# Patient Record
Sex: FEMALE | Race: WHITE | NOT HISPANIC OR LATINO | URBAN - METROPOLITAN AREA
[De-identification: names, ages, dates, MRNs, and addresses within clinical notes are randomized per-mention and may not be internally consistent; named-entity substitution may affect disease eponyms.]

---

## 2018-09-10 ENCOUNTER — APPOINTMENT (OUTPATIENT)
Dept: URBAN - METROPOLITAN AREA CLINIC 237 | Age: 38
Setting detail: DERMATOLOGY
End: 2018-09-18

## 2018-09-10 DIAGNOSIS — L71.8 OTHER ROSACEA: ICD-10-CM

## 2018-09-10 DIAGNOSIS — D22 MELANOCYTIC NEVI: ICD-10-CM

## 2018-09-10 DIAGNOSIS — L90.5 SCAR CONDITIONS AND FIBROSIS OF SKIN: ICD-10-CM

## 2018-09-10 DIAGNOSIS — L82.0 INFLAMED SEBORRHEIC KERATOSIS: ICD-10-CM

## 2018-09-10 DIAGNOSIS — L70.0 ACNE VULGARIS: ICD-10-CM

## 2018-09-10 DIAGNOSIS — L82.1 OTHER SEBORRHEIC KERATOSIS: ICD-10-CM

## 2018-09-10 PROBLEM — D22.4 MELANOCYTIC NEVI OF SCALP AND NECK: Status: ACTIVE | Noted: 2018-09-10

## 2018-09-10 PROBLEM — D22.71 MELANOCYTIC NEVI OF RIGHT LOWER LIMB, INCLUDING HIP: Status: ACTIVE | Noted: 2018-09-10

## 2018-09-10 PROBLEM — D22.5 MELANOCYTIC NEVI OF TRUNK: Status: ACTIVE | Noted: 2018-09-10

## 2018-09-10 PROBLEM — D22.62 MELANOCYTIC NEVI OF LEFT UPPER LIMB, INCLUDING SHOULDER: Status: ACTIVE | Noted: 2018-09-10

## 2018-09-10 PROBLEM — D22.72 MELANOCYTIC NEVI OF LEFT LOWER LIMB, INCLUDING HIP: Status: ACTIVE | Noted: 2018-09-10

## 2018-09-10 PROBLEM — D22.61 MELANOCYTIC NEVI OF RIGHT UPPER LIMB, INCLUDING SHOULDER: Status: ACTIVE | Noted: 2018-09-10

## 2018-09-10 PROCEDURE — OTHER OTHER: OTHER

## 2018-09-10 PROCEDURE — OTHER NOTED ON EXAM BUT NOT TREATED: OTHER

## 2018-09-10 PROCEDURE — OTHER PATIENT SPECIFIC COUNSELING: OTHER

## 2018-09-10 PROCEDURE — OTHER REASSURANCE: OTHER

## 2018-09-10 PROCEDURE — OTHER DEFER: OTHER

## 2018-09-10 PROCEDURE — 99204 OFFICE O/P NEW MOD 45 MIN: CPT

## 2018-09-10 PROCEDURE — OTHER OBSERVATION: OTHER

## 2018-09-10 PROCEDURE — OTHER OBSERVATION AND MEASURE: OTHER

## 2018-09-10 PROCEDURE — OTHER TREATMENT REGIMEN: OTHER

## 2018-09-10 PROCEDURE — OTHER DIAGNOSIS COMMENT: OTHER

## 2018-09-10 PROCEDURE — OTHER COUNSELING: OTHER

## 2018-09-10 PROCEDURE — OTHER EDUCATIONAL RESOURCES PROVIDED: OTHER

## 2018-09-10 PROCEDURE — OTHER PHOTO-DOCUMENTATION: OTHER

## 2018-09-10 ASSESSMENT — LOCATION SIMPLE DESCRIPTION DERM
LOCATION SIMPLE: LEFT POSTERIOR THIGH
LOCATION SIMPLE: LEFT UPPER BACK
LOCATION SIMPLE: LEFT THIGH
LOCATION SIMPLE: NOSE
LOCATION SIMPLE: RIGHT ANTERIOR NECK
LOCATION SIMPLE: RIGHT UPPER BACK
LOCATION SIMPLE: LEFT LOWER BACK
LOCATION SIMPLE: LEFT 3RD TOE
LOCATION SIMPLE: UPPER BACK
LOCATION SIMPLE: RIGHT PRETIBIAL REGION
LOCATION SIMPLE: LEFT POSTERIOR UPPER ARM
LOCATION SIMPLE: LEFT SCALP
LOCATION SIMPLE: GLABELLA
LOCATION SIMPLE: RIGHT CALF
LOCATION SIMPLE: RIGHT POSTERIOR UPPER ARM
LOCATION SIMPLE: LEFT CALF
LOCATION SIMPLE: RIGHT THIGH
LOCATION SIMPLE: RIGHT FOREHEAD
LOCATION SIMPLE: RIGHT CHEEK
LOCATION SIMPLE: ABDOMEN
LOCATION SIMPLE: LEFT BUTTOCK
LOCATION SIMPLE: LEFT CHEEK

## 2018-09-10 ASSESSMENT — LOCATION DETAILED DESCRIPTION DERM
LOCATION DETAILED: LEFT ANTERIOR PROXIMAL THIGH
LOCATION DETAILED: LEFT DISTAL POSTERIOR THIGH
LOCATION DETAILED: LEFT BUTTOCK
LOCATION DETAILED: LEFT SUPERIOR MEDIAL LOWER BACK
LOCATION DETAILED: LEFT INFERIOR CENTRAL MALAR CHEEK
LOCATION DETAILED: RIGHT DISTAL POSTERIOR UPPER ARM
LOCATION DETAILED: RIGHT ANTERIOR PROXIMAL THIGH
LOCATION DETAILED: RIGHT LATERAL UPPER BACK
LOCATION DETAILED: LEFT MID-UPPER BACK
LOCATION DETAILED: LEFT MEDIAL FRONTAL SCALP
LOCATION DETAILED: LEFT PROXIMAL POSTERIOR UPPER ARM
LOCATION DETAILED: LEFT LATERAL 3RD TOE
LOCATION DETAILED: RIGHT DISTAL CALF
LOCATION DETAILED: GLABELLA
LOCATION DETAILED: LEFT DISTAL CALF
LOCATION DETAILED: NASAL TIP
LOCATION DETAILED: PERIUMBILICAL SKIN
LOCATION DETAILED: RIGHT SUPERIOR MEDIAL FOREHEAD
LOCATION DETAILED: RIGHT INFERIOR ANTERIOR NECK
LOCATION DETAILED: RIGHT DISTAL PRETIBIAL REGION
LOCATION DETAILED: INFERIOR THORACIC SPINE
LOCATION DETAILED: LEFT LATERAL ABDOMEN
LOCATION DETAILED: RIGHT SUPERIOR MEDIAL UPPER BACK
LOCATION DETAILED: LEFT SUPERIOR UPPER BACK
LOCATION DETAILED: RIGHT INFERIOR CENTRAL MALAR CHEEK
LOCATION DETAILED: EPIGASTRIC SKIN

## 2018-09-10 ASSESSMENT — LOCATION ZONE DERM
LOCATION ZONE: NECK
LOCATION ZONE: TRUNK
LOCATION ZONE: LEG
LOCATION ZONE: NOSE
LOCATION ZONE: ARM
LOCATION ZONE: FACE
LOCATION ZONE: TOE
LOCATION ZONE: SCALP

## 2018-09-10 NOTE — PROCEDURE: OBSERVATION
Detail Level: Simple
Size Of Lesion In Cm (Optional): 0
Morphology Per Location (Optional): Brown, NR
Morphology Per Location (Optional): Dark brown, slightly raised
Size Of Lesion: 7x5mm
Size Of Lesion: 4x3mm
Morphology Per Location (Optional): 3 very dark brown macules
Size Of Lesion: 6x3mm
Morphology Per Location (Optional): Very light brown with dark brown dot
Detail Level: Zone
Morphology Per Location (Optional): Dark brown, jagged
Morphology Per Location (Optional): Brown maculopapule
Morphology Per Location (Optional): Jagged brown
Morphology Per Location (Optional): 3x2mm
Size Of Lesion: 5x6mm
Morphology Per Location (Optional): Pink/brown maculopapule
Morphology Per Location (Optional): Pinkish brown, slightly raised
Morphology Per Location (Optional): Very dark brown, homogenous
Morphology Per Location (Optional): Few, hazy

## 2018-09-10 NOTE — HPI: FULL BODY SKIN EXAMINATION
Additional History: Lesions of concern:\\n- multiple brown, warty growths on upper abdomen; occurred during pregnancy\\n- Pt feels that all her moles became darker after/during pregnancy\\n- right anterior scalp: brown, raised; present around 5yrs\\n- two wart-like lesions on L+R flank\\n\\nLast FBE: 3+yrs ago by Dr. Kee in [place] Additional History: Lesions of concern:\\n- multiple brown, warty growths on upper abdomen; occurred during pregnancy\\n- Pt feels that all her moles became darker after/during pregnancy\\n- right anterior scalp: brown, raised; present around 5yrs\\n- two wart-like lesions on L+R flank\\n\\nLast FBE: 3+yrs ago by Dr. Kee in [place]

## 2018-09-10 NOTE — PROCEDURE: OTHER
Detail Level: Zone
Other (Free Text): AHU + BA applied. Advised that Pt avoid using Neosporin.  Recommends plain healing ointments (ie. Vaseline, AHU).
Note Text (......Xxx Chief Complaint.): This diagnosis correlates with the

## 2018-09-10 NOTE — PROCEDURE: PHOTO-DOCUMENTATION
Photo Preface (Leave Blank If You Do Not Want): Photographs obtained today
Detail Level: Zone
Details (Free Text): 4

## 2018-09-10 NOTE — PROCEDURE: PATIENT SPECIFIC COUNSELING
Discussed that lesion can be tx’d with LN2 if causing irritation for Pt.
Pt is not interested in tx’ing AR at this time.
Detail Level: Simple
Recommend HC 1% cr bid PRN to areas with irritated lesions. Pt to stop applying tea tree oil

## 2018-09-25 ENCOUNTER — APPOINTMENT (OUTPATIENT)
Dept: URBAN - METROPOLITAN AREA CLINIC 237 | Age: 38
Setting detail: DERMATOLOGY
End: 2018-09-25

## 2018-09-25 DIAGNOSIS — L82.0 INFLAMED SEBORRHEIC KERATOSIS: ICD-10-CM

## 2018-09-25 PROCEDURE — OTHER LIQUID NITROGEN: OTHER

## 2018-09-25 PROCEDURE — OTHER TREATMENT REGIMEN: OTHER

## 2018-09-25 PROCEDURE — 17111 DESTRUCT LESION 15 OR MORE: CPT

## 2018-09-25 PROCEDURE — OTHER PATIENT SPECIFIC COUNSELING: OTHER

## 2018-09-25 ASSESSMENT — LOCATION DETAILED DESCRIPTION DERM
LOCATION DETAILED: LEFT RIB CAGE
LOCATION DETAILED: SUBXIPHOID
LOCATION DETAILED: RIGHT RIB CAGE

## 2018-09-25 ASSESSMENT — LOCATION ZONE DERM: LOCATION ZONE: TRUNK

## 2018-09-25 ASSESSMENT — LOCATION SIMPLE DESCRIPTION DERM: LOCATION SIMPLE: ABDOMEN

## 2018-09-25 NOTE — PROCEDURE: PATIENT SPECIFIC COUNSELING
Detail Level: Simple
Recommends a non-steroidal, soothing topical for anti-itch. (S) Gold Bond eczema relief with oatmeal given for pt to try.  If lesions do not respond to LN2, RTC

## 2018-09-25 NOTE — PROCEDURE: LIQUID NITROGEN
Render Post Care In The Note?: yes
Detail Level: Zone
Number Of Freeze-Thaw Cycles: 3 freeze-thaw cycles
Medical Necessity Clause: This procedure was medically necessary because the lesions that were treated were:
Include Z78.9 (Other Specified Conditions Influencing Health Status) As An Associated Diagnosis?: No
Consent: The patient's consent was obtained including but not limited to risks of crusting, scabbing, blistering, scarring, darker or lighter pigmentary change, recurrence, incomplete removal and infection.
Duration Of Freeze Thaw-Cycle (Seconds): 0
Post-Care Instructions: I reviewed with the patient in detail post-care instructions. Patient is to wear sunprotection, and avoid picking at any of the treated lesions. Pt may apply Vaseline to crusted or scabbing areas.
Medical Necessity Information: It is in your best interest to select a reason for this procedure from the list below. All of these items fulfill various CMS LCD requirements except the new and changing color options.
Total Number Of Lesions Treated: 17

## 2018-09-25 NOTE — PROCEDURE: TREATMENT REGIMEN
Detail Level: Zone
Discontinue Regimen: D/C HC 1% cr
Initiate Treatment: Soothing moisturizer/anti-itch PRN - (S) Gold Bond eczema relief

## 2022-10-18 ENCOUNTER — APPOINTMENT (OUTPATIENT)
Dept: URBAN - METROPOLITAN AREA CLINIC 237 | Age: 42
Setting detail: DERMATOLOGY
End: 2022-10-18

## 2022-10-18 DIAGNOSIS — L81.4 OTHER MELANIN HYPERPIGMENTATION: ICD-10-CM

## 2022-10-18 DIAGNOSIS — D22 MELANOCYTIC NEVI: ICD-10-CM

## 2022-10-18 DIAGNOSIS — Z80.8 FAMILY HISTORY OF MALIGNANT NEOPLASM OF OTHER ORGANS OR SYSTEMS: ICD-10-CM

## 2022-10-18 DIAGNOSIS — L82.1 OTHER SEBORRHEIC KERATOSIS: ICD-10-CM

## 2022-10-18 PROBLEM — D22.5 MELANOCYTIC NEVI OF TRUNK: Status: ACTIVE | Noted: 2022-10-18

## 2022-10-18 PROBLEM — D22.62 MELANOCYTIC NEVI OF LEFT UPPER LIMB, INCLUDING SHOULDER: Status: ACTIVE | Noted: 2022-10-18

## 2022-10-18 PROBLEM — D22.4 MELANOCYTIC NEVI OF SCALP AND NECK: Status: ACTIVE | Noted: 2022-10-18

## 2022-10-18 PROBLEM — D22.39 MELANOCYTIC NEVI OF OTHER PARTS OF FACE: Status: ACTIVE | Noted: 2022-10-18

## 2022-10-18 PROBLEM — D22.71 MELANOCYTIC NEVI OF RIGHT LOWER LIMB, INCLUDING HIP: Status: ACTIVE | Noted: 2022-10-18

## 2022-10-18 PROBLEM — D22.61 MELANOCYTIC NEVI OF RIGHT UPPER LIMB, INCLUDING SHOULDER: Status: ACTIVE | Noted: 2022-10-18

## 2022-10-18 PROBLEM — D22.72 MELANOCYTIC NEVI OF LEFT LOWER LIMB, INCLUDING HIP: Status: ACTIVE | Noted: 2022-10-18

## 2022-10-18 PROCEDURE — OTHER COUNSELING: OTHER

## 2022-10-18 PROCEDURE — 11102 TANGNTL BX SKIN SINGLE LES: CPT

## 2022-10-18 PROCEDURE — OTHER OBSERVATION AND MEASURE: OTHER

## 2022-10-18 PROCEDURE — OTHER BIOPSY BY SHAVE METHOD: OTHER

## 2022-10-18 PROCEDURE — 11103 TANGNTL BX SKIN EA SEP/ADDL: CPT

## 2022-10-18 PROCEDURE — OTHER DIAGNOSIS COMMENT: OTHER

## 2022-10-18 PROCEDURE — OTHER MIPS QUALITY: OTHER

## 2022-10-18 PROCEDURE — 99203 OFFICE O/P NEW LOW 30 MIN: CPT | Mod: 25

## 2022-10-18 PROCEDURE — OTHER OBSERVATION: OTHER

## 2022-10-18 ASSESSMENT — LOCATION DETAILED DESCRIPTION DERM
LOCATION DETAILED: LEFT DISTAL PRETIBIAL REGION
LOCATION DETAILED: RIGHT PROXIMAL LATERAL POSTERIOR THIGH
LOCATION DETAILED: LEFT SUPERIOR MEDIAL LOWER BACK
LOCATION DETAILED: LEFT RIB CAGE
LOCATION DETAILED: RIGHT INFERIOR ANTERIOR NECK
LOCATION DETAILED: LEFT DISTAL POSTERIOR THIGH
LOCATION DETAILED: PERIUMBILICAL SKIN
LOCATION DETAILED: LEFT MEDIAL FRONTAL SCALP
LOCATION DETAILED: RIGHT INFERIOR CENTRAL MALAR CHEEK
LOCATION DETAILED: RIGHT DISTAL CALF
LOCATION DETAILED: LEFT BUTTOCK
LOCATION DETAILED: RIGHT INFERIOR UPPER BACK
LOCATION DETAILED: RIGHT CENTRAL FRONTAL SCALP
LOCATION DETAILED: RIGHT ANTERIOR PROXIMAL THIGH
LOCATION DETAILED: RIGHT ANTERIOR AXILLA
LOCATION DETAILED: RIGHT LATERAL BREAST 7-8:00 REGION
LOCATION DETAILED: RIGHT DORSAL 2ND TOE
LOCATION DETAILED: RIGHT LATERAL BREAST 11-12:00 REGION
LOCATION DETAILED: LEFT LATERAL 3RD TOE
LOCATION DETAILED: RIGHT DISTAL POSTERIOR UPPER ARM
LOCATION DETAILED: LEFT ANTERIOR PROXIMAL THIGH
LOCATION DETAILED: LEFT PROXIMAL POSTERIOR UPPER ARM
LOCATION DETAILED: EPIGASTRIC SKIN
LOCATION DETAILED: RIGHT DISTAL PRETIBIAL REGION
LOCATION DETAILED: LEFT SUPERIOR UPPER BACK
LOCATION DETAILED: LEFT DISTAL CALF
LOCATION DETAILED: LEFT MID-UPPER BACK
LOCATION DETAILED: RIGHT SUPERIOR FOREHEAD

## 2022-10-18 ASSESSMENT — LOCATION SIMPLE DESCRIPTION DERM
LOCATION SIMPLE: RIGHT SCALP
LOCATION SIMPLE: LEFT UPPER BACK
LOCATION SIMPLE: LEFT THIGH
LOCATION SIMPLE: RIGHT ANTERIOR NECK
LOCATION SIMPLE: RIGHT CHEEK
LOCATION SIMPLE: RIGHT POSTERIOR UPPER ARM
LOCATION SIMPLE: RIGHT PRETIBIAL REGION
LOCATION SIMPLE: LEFT POSTERIOR THIGH
LOCATION SIMPLE: LEFT BUTTOCK
LOCATION SIMPLE: RIGHT THIGH
LOCATION SIMPLE: RIGHT FOREHEAD
LOCATION SIMPLE: RIGHT CALF
LOCATION SIMPLE: LEFT PRETIBIAL REGION
LOCATION SIMPLE: RIGHT AXILLA
LOCATION SIMPLE: LEFT SCALP
LOCATION SIMPLE: ABDOMEN
LOCATION SIMPLE: RIGHT UPPER BACK
LOCATION SIMPLE: LEFT CALF
LOCATION SIMPLE: LEFT POSTERIOR UPPER ARM
LOCATION SIMPLE: LEFT 3RD TOE
LOCATION SIMPLE: RIGHT POSTERIOR THIGH
LOCATION SIMPLE: RIGHT 2ND TOE
LOCATION SIMPLE: LEFT LOWER BACK
LOCATION SIMPLE: RIGHT BREAST

## 2022-10-18 ASSESSMENT — LOCATION ZONE DERM
LOCATION ZONE: LEG
LOCATION ZONE: AXILLAE
LOCATION ZONE: ARM
LOCATION ZONE: TRUNK
LOCATION ZONE: FACE
LOCATION ZONE: TOE
LOCATION ZONE: NECK
LOCATION ZONE: SCALP

## 2022-10-18 NOTE — PROCEDURE: BIOPSY BY SHAVE METHOD
Hide Topical Anesthesia?: No
Curettage Text: The wound bed was treated with curettage after the biopsy was performed.
Validate Note Data (See Information Below): Yes
Depth Of Biopsy: dermis
Billing Type: Third-Party Bill
Information: Selecting Yes will display possible errors in your note based on the variables you have selected. This validation is only offered as a suggestion for you. PLEASE NOTE THAT THE VALIDATION TEXT WILL BE REMOVED WHEN YOU FINALIZE YOUR NOTE. IF YOU WANT TO FAX A PRELIMINARY NOTE YOU WILL NEED TO TOGGLE THIS TO 'NO' IF YOU DO NOT WANT IT IN YOUR FAXED NOTE.
Consent: Written consent was obtained and risks were reviewed including but not limited to scarring, infection, bleeding, scabbing, incomplete removal, nerve damage and allergy to anesthesia.
Silver Nitrate Text: The wound bed was treated with silver nitrate after the biopsy was performed.
Detail Level: Detailed
Anesthesia Volume In Cc (Will Not Render If 0): 0.1
Notification Instructions: Patient will be notified of biopsy results. However, patient instructed to call the office if not contacted within 2 weeks.
Biopsy Type: H and E
Post-Care Instructions: I reviewed with the patient in detail post-care instructions. Patient is to keep the biopsy site dry overnight, and then apply bacitracin twice daily until healed. Patient may apply hydrogen peroxide soaks to remove any crusting.
Dressing: Band-Aid
Biopsy Method: Dermablade
Type Of Destruction Used: Curettage
Additional Anesthesia Volume In Cc (Will Not Render If 0): 0
Wound Care: Aquaphor
Electrodesiccation Text: The wound bed was treated with electrodesiccation after the biopsy was performed.
Hemostasis: Aluminum Chloride
Anesthesia Type: 1% lidocaine with epinephrine
Cryotherapy Text: The wound bed was treated with cryotherapy after the biopsy was performed.
Electrodesiccation And Curettage Text: The wound bed was treated with electrodesiccation and curettage after the biopsy was performed.

## 2022-10-18 NOTE — PROCEDURE: OBSERVATION
Size Of Lesion: 4x4mm lbr
Detail Level: Detailed
Size Of Lesion: 5x5mm brown maculopapule
Size Of Lesion: 4x3mm mbr
Size Of Lesion: 6x3mm mbr macule
Detail Level: Simple
Size Of Lesion: 3x2mm mbr
Size Of Lesion: 2x1.5mm mbr
Size Of Lesion: 7x5mm Pinkish brown, slightly raised
Size Of Lesion: 8x5mm lbr plaque with mbr center
Size Of Lesion: 3x2mm mbr thin papule
Size Of Lesion: 4x3mm Dark brown, jagged
Size Of Lesion: 1.5x1.5mm mbr macule
Size Of Lesion: 2x2mm mbr macule
Size Of Lesion: 4x3mm Very light brown with dark brown dot
Size Of Lesion: 4x2mm mbr

## 2024-07-15 ENCOUNTER — APPOINTMENT (OUTPATIENT)
Age: 44
Setting detail: DERMATOLOGY
End: 2024-07-15

## 2024-07-15 DIAGNOSIS — L81.4 OTHER MELANIN HYPERPIGMENTATION: ICD-10-CM

## 2024-07-15 DIAGNOSIS — D22 MELANOCYTIC NEVI: ICD-10-CM

## 2024-07-15 DIAGNOSIS — Z80.8 FAMILY HISTORY OF MALIGNANT NEOPLASM OF OTHER ORGANS OR SYSTEMS: ICD-10-CM

## 2024-07-15 DIAGNOSIS — D18.0 HEMANGIOMA: ICD-10-CM

## 2024-07-15 DIAGNOSIS — B07.8 OTHER VIRAL WARTS: ICD-10-CM

## 2024-07-15 DIAGNOSIS — L82.1 OTHER SEBORRHEIC KERATOSIS: ICD-10-CM

## 2024-07-15 PROBLEM — D22.72 MELANOCYTIC NEVI OF LEFT LOWER LIMB, INCLUDING HIP: Status: ACTIVE | Noted: 2024-07-15

## 2024-07-15 PROBLEM — D22.5 MELANOCYTIC NEVI OF TRUNK: Status: ACTIVE | Noted: 2024-07-15

## 2024-07-15 PROBLEM — D22.4 MELANOCYTIC NEVI OF SCALP AND NECK: Status: ACTIVE | Noted: 2024-07-15

## 2024-07-15 PROBLEM — D22.61 MELANOCYTIC NEVI OF RIGHT UPPER LIMB, INCLUDING SHOULDER: Status: ACTIVE | Noted: 2024-07-15

## 2024-07-15 PROBLEM — D22.71 MELANOCYTIC NEVI OF RIGHT LOWER LIMB, INCLUDING HIP: Status: ACTIVE | Noted: 2024-07-15

## 2024-07-15 PROBLEM — D22.62 MELANOCYTIC NEVI OF LEFT UPPER LIMB, INCLUDING SHOULDER: Status: ACTIVE | Noted: 2024-07-15

## 2024-07-15 PROBLEM — D18.01 HEMANGIOMA OF SKIN AND SUBCUTANEOUS TISSUE: Status: ACTIVE | Noted: 2024-07-15

## 2024-07-15 PROBLEM — D22.39 MELANOCYTIC NEVI OF OTHER PARTS OF FACE: Status: ACTIVE | Noted: 2024-07-15

## 2024-07-15 PROCEDURE — OTHER COUNSELING: OTHER

## 2024-07-15 PROCEDURE — 17110 DESTRUCT B9 LESION 1-14: CPT

## 2024-07-15 PROCEDURE — 99213 OFFICE O/P EST LOW 20 MIN: CPT | Mod: 25

## 2024-07-15 PROCEDURE — OTHER OBSERVATION: OTHER

## 2024-07-15 PROCEDURE — OTHER MIPS QUALITY: OTHER

## 2024-07-15 PROCEDURE — OTHER DIAGNOSIS COMMENT: OTHER

## 2024-07-15 PROCEDURE — OTHER OBSERVATION AND MEASURE: OTHER

## 2024-07-15 PROCEDURE — OTHER LIQUID NITROGEN: OTHER

## 2024-07-15 PROCEDURE — OTHER DEFER: OTHER

## 2024-07-15 ASSESSMENT — LOCATION SIMPLE DESCRIPTION DERM
LOCATION SIMPLE: LEFT POSTERIOR THIGH
LOCATION SIMPLE: ABDOMEN
LOCATION SIMPLE: RIGHT POSTERIOR THIGH
LOCATION SIMPLE: RIGHT FOREHEAD
LOCATION SIMPLE: LEFT FOREHEAD
LOCATION SIMPLE: LEFT 3RD TOE
LOCATION SIMPLE: RIGHT UPPER BACK
LOCATION SIMPLE: RIGHT SCALP
LOCATION SIMPLE: LEFT PRETIBIAL REGION
LOCATION SIMPLE: RIGHT AXILLA
LOCATION SIMPLE: LEFT UPPER BACK
LOCATION SIMPLE: RIGHT CHEEK
LOCATION SIMPLE: RIGHT SHOULDER
LOCATION SIMPLE: LEFT SCALP
LOCATION SIMPLE: RIGHT 2ND TOE
LOCATION SIMPLE: LEFT POSTERIOR UPPER ARM
LOCATION SIMPLE: LEFT THIGH
LOCATION SIMPLE: LEFT BUTTOCK
LOCATION SIMPLE: RIGHT CALF
LOCATION SIMPLE: RIGHT ANTERIOR NECK
LOCATION SIMPLE: RIGHT POSTERIOR UPPER ARM
LOCATION SIMPLE: RIGHT THIGH
LOCATION SIMPLE: RIGHT PRETIBIAL REGION
LOCATION SIMPLE: LEFT CALF
LOCATION SIMPLE: RIGHT BREAST

## 2024-07-15 ASSESSMENT — LOCATION DETAILED DESCRIPTION DERM
LOCATION DETAILED: RIGHT DORSAL 2ND TOE
LOCATION DETAILED: LEFT DISTAL CALF
LOCATION DETAILED: LEFT RIB CAGE
LOCATION DETAILED: LEFT DISTAL PRETIBIAL REGION
LOCATION DETAILED: LEFT MID-UPPER BACK
LOCATION DETAILED: RIGHT INFERIOR CENTRAL MALAR CHEEK
LOCATION DETAILED: RIGHT ANTERIOR SHOULDER
LOCATION DETAILED: LEFT PROXIMAL POSTERIOR UPPER ARM
LOCATION DETAILED: RIGHT PROXIMAL LATERAL POSTERIOR THIGH
LOCATION DETAILED: LEFT BUTTOCK
LOCATION DETAILED: PERIUMBILICAL SKIN
LOCATION DETAILED: RIGHT ANTERIOR PROXIMAL THIGH
LOCATION DETAILED: LEFT ANTERIOR PROXIMAL THIGH
LOCATION DETAILED: RIGHT DISTAL CALF
LOCATION DETAILED: EPIGASTRIC SKIN
LOCATION DETAILED: LEFT MEDIAL FRONTAL SCALP
LOCATION DETAILED: RIGHT SUPERIOR FOREHEAD
LOCATION DETAILED: LEFT LATERAL 3RD TOE
LOCATION DETAILED: LEFT SUPERIOR FOREHEAD
LOCATION DETAILED: LEFT DISTAL POSTERIOR THIGH
LOCATION DETAILED: RIGHT INFERIOR UPPER BACK
LOCATION DETAILED: RIGHT INFERIOR ANTERIOR NECK
LOCATION DETAILED: RIGHT DISTAL POSTERIOR UPPER ARM
LOCATION DETAILED: RIGHT ANTERIOR AXILLA
LOCATION DETAILED: RIGHT CENTRAL FRONTAL SCALP
LOCATION DETAILED: LEFT SUPERIOR UPPER BACK
LOCATION DETAILED: RIGHT LATERAL BREAST 11-12:00 REGION
LOCATION DETAILED: RIGHT DISTAL PRETIBIAL REGION

## 2024-07-15 ASSESSMENT — LOCATION ZONE DERM
LOCATION ZONE: LEG
LOCATION ZONE: NECK
LOCATION ZONE: TRUNK
LOCATION ZONE: SCALP
LOCATION ZONE: ARM
LOCATION ZONE: TOE
LOCATION ZONE: AXILLAE
LOCATION ZONE: FACE

## 2024-07-15 NOTE — HPI: FULL BODY SKIN EXAMINATION
What Type Of Note Output Would You Prefer (Optional)?: Standard Output
What Is The Reason For Today's Visit?: Full Body Skin Examination
What Is The Reason For Today's Visit? (Being Monitored For X): concerning skin lesions on an annual basis
Additional History: Pt states concerned of some fleshy lesions on abdomen. Also concerned about a poss wart on right shoulder.
What Is The Reason For Today's Visit? (Being Monitored For X): the development of new lesions

## 2024-07-15 NOTE — PROCEDURE: OBSERVATION
Detail Level: Detailed
Size Of Lesion: 1.5x1.5mm mbr macule
Size Of Lesion: 4x3mm Dark brown, jagged
Detail Level: Simple
Size Of Lesion: 8x5mm lbr plaque with mbr center
Size Of Lesion: 5x5mm brown maculopapule
Size Of Lesion: 2x1.5mm mbr
Size Of Lesion: 4x4mm lbr
Size Of Lesion: 7x5mm Pinkish brown, slightly raised
Size Of Lesion: 3x2mm mbr
Size Of Lesion: 4x3mm mbr
Size Of Lesion: 6x3mm mbr macule
Size Of Lesion: 2x2mm mbr macule
Size Of Lesion: 4x3mm Very light brown with dark brown dot
Size Of Lesion: 4x2mm mbr
Size Of Lesion: 3x2mm mbr thin papule

## 2024-07-15 NOTE — PROCEDURE: LIQUID NITROGEN
Number Of Freeze-Thaw Cycles: 3 freeze-thaw cycles
Show Applicator Variable?: Yes
Detail Level: Detailed
Render Post-Care Instructions In Note?: no
Medical Necessity Information: It is in your best interest to select a reason for this procedure from the list below. All of these items fulfill various CMS LCD requirements except the new and changing color options.
Post-Care Instructions: I reviewed with the patient in detail post-care instructions. Patient is to wear sunprotection, and avoid picking at any of the treated lesions. Pt may apply Vaseline to crusted or scabbing areas.
Consent: The patient's consent was obtained including but not limited to risks of crusting, scabbing, blistering, scarring, darker or lighter pigmentary change, recurrence, incomplete removal and infection.
Medical Necessity Clause: This procedure was medically necessary because the lesions that were treated were:
Spray Paint Text: The liquid nitrogen was applied to the skin utilizing a spray paint frosting technique.

## 2024-07-15 NOTE — PROCEDURE: DEFER
Detail Level: Detailed
Introduction Text (Please End With A Colon): The following procedure was deferred: Liquid nitrogen
Size Of Lesion In Cm (Optional): 0
Procedure To Be Performed At Next Visit: Biopsy by shave method

## 2024-07-29 ENCOUNTER — APPOINTMENT (OUTPATIENT)
Age: 44
Setting detail: DERMATOLOGY
End: 2024-07-29

## 2024-07-29 DIAGNOSIS — D22 MELANOCYTIC NEVI: ICD-10-CM

## 2024-07-29 PROBLEM — D22.5 MELANOCYTIC NEVI OF TRUNK: Status: ACTIVE | Noted: 2024-07-29

## 2024-07-29 PROCEDURE — 11300 SHAVE SKIN LESION 0.5 CM/<: CPT | Mod: 76

## 2024-07-29 PROCEDURE — OTHER SHAVE REMOVAL: OTHER

## 2024-07-29 PROCEDURE — OTHER DEFER: OTHER

## 2024-07-29 PROCEDURE — 11300 SHAVE SKIN LESION 0.5 CM/<: CPT

## 2024-07-29 ASSESSMENT — LOCATION DETAILED DESCRIPTION DERM
LOCATION DETAILED: PERIUMBILICAL SKIN
LOCATION DETAILED: RIGHT SUPERIOR UPPER BACK
LOCATION DETAILED: INFERIOR THORACIC SPINE
LOCATION DETAILED: RIGHT INFERIOR LATERAL UPPER BACK

## 2024-07-29 ASSESSMENT — LOCATION ZONE DERM: LOCATION ZONE: TRUNK

## 2024-07-29 ASSESSMENT — LOCATION SIMPLE DESCRIPTION DERM
LOCATION SIMPLE: ABDOMEN
LOCATION SIMPLE: UPPER BACK
LOCATION SIMPLE: RIGHT UPPER BACK

## 2024-07-29 NOTE — PROCEDURE: SHAVE REMOVAL
Medical Necessity Information: It is in your best interest to select a reason for this procedure from the list below. All of these items fulfill various CMS LCD requirements except the new and changing color options.
Medical Necessity Clause: This procedure was medically necessary because the lesion that was treated was:
Lab: [id number]
Lab Facility: [id number]
Detail Level: Detailed
Was A Bandage Applied: Yes
Size Of Lesion In Cm (Required): 0.4
Depth Of Shave: dermis
Biopsy Method: Dermablade
Anesthesia Type: 1% lidocaine with epinephrine
Hemostasis: Drysol
Wound Care: Petrolatum
Render Path Notes In Note?: No
Consent was obtained from the patient. The risks and benefits to therapy were discussed in detail. Specifically, the risks of infection, scarring, bleeding, prolonged wound healing, incomplete removal, allergy to anesthesia, nerve injury and recurrence were addressed. Prior to the procedure, the treatment site was clearly identified and confirmed by the patient. All components of Universal Protocol/PAUSE Rule completed.
Post-Care Instructions: I reviewed with the patient in detail post-care instructions. Patient is to keep the biopsy site dry overnight, and then apply bacitracin twice daily until healed. Patient may apply hydrogen peroxide soaks to remove any crusting.
Notification Instructions: Patient will be notified of pathology results. However, patient instructed to call the office if not contacted within 2 weeks.
Billing Type: Third-Party Bill

## 2024-09-19 ENCOUNTER — APPOINTMENT (OUTPATIENT)
Dept: URBAN - METROPOLITAN AREA CLINIC 237 | Age: 44
Setting detail: DERMATOLOGY
End: 2024-09-19

## 2024-09-19 DIAGNOSIS — D22 MELANOCYTIC NEVI: ICD-10-CM

## 2024-09-19 PROBLEM — D22.5 MELANOCYTIC NEVI OF TRUNK: Status: ACTIVE | Noted: 2024-09-19

## 2024-09-19 PROCEDURE — OTHER PHOTO-DOCUMENTATION: OTHER

## 2024-09-19 PROCEDURE — 11300 SHAVE SKIN LESION 0.5 CM/<: CPT

## 2024-09-19 PROCEDURE — OTHER COUNSELING: OTHER

## 2024-09-19 PROCEDURE — 11300 SHAVE SKIN LESION 0.5 CM/<: CPT | Mod: 76

## 2024-09-19 PROCEDURE — OTHER ADDITIONAL NOTES: OTHER

## 2024-09-19 PROCEDURE — OTHER SHAVE REMOVAL: OTHER

## 2024-09-19 ASSESSMENT — LOCATION SIMPLE DESCRIPTION DERM
LOCATION SIMPLE: RIGHT UPPER BACK
LOCATION SIMPLE: ABDOMEN

## 2024-09-19 ASSESSMENT — LOCATION DETAILED DESCRIPTION DERM
LOCATION DETAILED: RIGHT SUPERIOR UPPER BACK
LOCATION DETAILED: PERIUMBILICAL SKIN

## 2024-09-19 ASSESSMENT — LOCATION ZONE DERM: LOCATION ZONE: TRUNK

## 2024-09-19 NOTE — PROCEDURE: ADDITIONAL NOTES
Detail Level: Detailed
Additional Notes: Patient requests benign results.
Render Risk Assessment In Note?: no

## 2024-09-19 NOTE — PROCEDURE: SHAVE REMOVAL
Medical Necessity Information: It is in your best interest to select a reason for this procedure from the list below. All of these items fulfill various CMS LCD requirements except the new and changing color options.
Medical Necessity Clause: This procedure was medically necessary because the lesion that was treated was:
Lab: [id number]
Lab Facility: [id number]
Detail Level: Detailed
Was A Bandage Applied: Yes
Size Of Lesion In Cm (Required): 0.4
Depth Of Shave: dermis
Biopsy Method: Dermablade
Anesthesia Type: 1% lidocaine with epinephrine
Hemostasis: Drysol
Wound Care: Petrolatum
Render Path Notes In Note?: No
Consent was obtained from the patient. The risks and benefits to therapy were discussed in detail. Specifically, the risks of infection, scarring, bleeding, prolonged wound healing, incomplete removal, allergy to anesthesia, nerve injury and recurrence were addressed. Prior to the procedure, the treatment site was clearly identified and confirmed by the patient. All components of Universal Protocol/PAUSE Rule completed.
Post-Care Instructions: I reviewed with the patient in detail post-care instructions. Patient is to keep the biopsy site dry overnight, and then apply bacitracin twice daily until healed. Patient may apply hydrogen peroxide soaks to remove any crusting.
Notification Instructions: Patient will be notified of pathology results. However, patient instructed to call the office if not contacted within 2 weeks.
Billing Type: Third-Party Bill
Size Of Lesion In Cm (Required): 0.3